# Patient Record
Sex: MALE | Race: OTHER | NOT HISPANIC OR LATINO | URBAN - METROPOLITAN AREA
[De-identification: names, ages, dates, MRNs, and addresses within clinical notes are randomized per-mention and may not be internally consistent; named-entity substitution may affect disease eponyms.]

---

## 2017-01-03 VITALS
WEIGHT: 192.9 LBS | RESPIRATION RATE: 18 BRPM | HEIGHT: 66 IN | OXYGEN SATURATION: 98 % | TEMPERATURE: 99 F | HEART RATE: 65 BPM | DIASTOLIC BLOOD PRESSURE: 62 MMHG | SYSTOLIC BLOOD PRESSURE: 125 MMHG

## 2017-01-03 NOTE — H&P ADULT. - HISTORY OF PRESENT ILLNESS
70yo male with PMHx of HTN, Hyperlipidemia, hypothyroid, CKD stage III (unknown baseline), CHF (unknown EF), MR, Afib (on pradaxa, last dose 1/2/16 - confirm) who presented to the cardiologist with c/o... (CCS III anginal symptoms as per MD note)    Echo: revealed apical hypokinesis, EF 40% (as per MD note).  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 Srivastava.    In light of patients CCS III anginal symptoms, and abnormal Echo and Holter monitor patient is referred for cardiac catheterization w/ intervention if indicated. 70yo male, FamHx of Heart disease, with PMHx of HTN, Hyperlipidemia, hypothyroid, CKD stage III (unknown baseline), systolic CHF (EF 40% by Echo), MR, Maurice (on pradaxa, last dose 1/2/16) who presented to the cardiologist with c/o several months of SOB upon walking several blocks (CCS III anginal equivalent symptoms).  Patient states since he was prescribed Lasix he no longer feels SOB.  He denies any CP, palpitations, LE swelling, orthopnea, PND, dizziness, syncope, fevers, chills.    Patient underwent an Echo: revealed apical hypokinesis, EF 40% (as per MD note).  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 Srivastava (patient states he has not seen Dr. Srivastava yet).    In light of patients CCS III anginal equivalent symptoms, abnormal Echo and Holter monitor patient is referred for cardiac catheterization w/ intervention if indicated. ** confirm meds on arrival  ** attempting to obtain Imaging studies and recent lab work from office    72yo male, FamHx of Heart disease, with PMHx of HTN, Hyperlipidemia, hypothyroid, CKD stage III (unknown baseline), systolic CHF (EF 40% by Echo), MR, Afib (on pradaxa, last dose 1/2/16) who presented to the cardiologist with c/o several months of SOB upon walking several blocks (CCS III anginal equivalent symptoms).  Patient states since he was prescribed Lasix he no longer feels SOB.  He denies any CP, palpitations, LE swelling, orthopnea, PND, dizziness, syncope, fevers, chills.  Patient underwent an Echo: revealed apical hypokinesis, EF 40% (as per MD note).  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 Srivastava (patient states he has not seen Dr. Srivastava yet).    In light of patients CCS III anginal equivalent symptoms, abnormal Echo and Holter monitor patient is referred for cardiac catheterization w/ intervention if indicated. ** confirm meds on arrival  ** Early for hydration.  ** awaiting Fax of recent Imaging studies and recent lab work from office    70yo male, FamHx of Heart disease, with PMHx of HTN, Hyperlipidemia, hypothyroid, CKD stage III (unknown baseline), systolic CHF (EF 40% by Echo), MR, Afib (on pradaxa, last dose 1/2/16) who presented to the cardiologist with c/o several months of SOB upon walking several blocks (CCS III anginal equivalent symptoms).  Patient states since he was prescribed Lasix he no longer feels SOB.  He denies any CP, palpitations, LE swelling, orthopnea, PND, dizziness, syncope, fevers, chills.  Patient underwent an Echo: revealed apical hypokinesis, EF 40% (as per MD note).  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 Srivastava (patient states he has not seen Dr. Srivastava yet).    In light of patients CCS III anginal equivalent symptoms, abnormal Echo and Holter monitor patient is referred for cardiac catheterization w/ intervention if indicated. ** confirm meds on arrival  ** Early for hydration.     70yo male, FamHx of Heart disease, with PMHx of HTN, Hyperlipidemia, hypothyroid, CKD stage III (Crt 1.65 on 12/2/16), systolic CHF (EF 40% by Echo 3/11/16), Afib (on pradaxa, last dose 1/2/16) who presented to the cardiologist with c/o several months of SOB upon walking several blocks (CCS III anginal equivalent symptoms).  Patient states since he was prescribed Lasix he no longer feels SOB.  He denies any CP, palpitations, LE swelling, orthopnea, PND, dizziness, syncope, fevers, chills.  Patient underwent an Echo 3/11/16: revealed mid and apical anterior wall hypokinesis, basal and mid inferior wall hypokinesis, and apical akinesis, EF 40%, mod to severe dilation of LV, dilated left atrium, diastolic dysfunction, dilatation of the aortic root at the level of the sinusus of valsava measuring 4.1cm, moderate AI, moderate MR.  Patient also underwent a Holter monitor 12/8/16: revealed predominant Afib, puases were noted (longest 2.628sec), Ventricular ectopics were noted.  Patient referred to Dr. Srivastava (patient states he has not seen Dr. Srivastava yet).    In light of patients CCS III anginal equivalent symptoms, abnormal Echo and Holter monitor patient is referred for cardiac catheterization w/ intervention if indicated. 70yo male, FamHx of Heart disease, with PMHx of HTN, Hyperlipidemia, hypothyroid, CKD stage III (Crt 1.65 on 12/2/16), systolic CHF (EF 40% by Echo 3/11/16), Afib (on pradaxa, last dose 1/2/16) who presented to the cardiologist with c/o several months of SOB upon walking several blocks (CCS III anginal equivalent symptoms).  Patient states since he was prescribed Lasix he no longer feels SOB.  He denies any CP, palpitations, LE swelling, orthopnea, PND, dizziness, syncope, fevers, chills.  Patient underwent an Echo 3/11/16: revealed mid and apical anterior wall hypokinesis, basal and mid inferior wall hypokinesis, and apical akinesis, EF 40%, mod to severe dilation of LV, dilated left atrium, diastolic dysfunction, dilatation of the aortic root at the level of the sinusus of valsava measuring 4.1cm, moderate AI, moderate MR.  Patient also underwent a Holter monitor 12/8/16: revealed predominant Afib, puases were noted (longest 2.628sec), Ventricular ectopics were noted.  Patient referred to Dr. Srivastava (patient states he has not seen Dr. Srivastava yet).    In light of patients CCS III anginal equivalent symptoms, abnormal Echo and Holter monitor patient is referred for cardiac catheterization w/ intervention if indicated.

## 2017-01-03 NOTE — H&P ADULT. - ASSESSMENT
70yo Male FmHx of Heart disease, with PMHx of HTN, Hyperlipidemia, hypothyroid, CKD stage III (Crt 1.65 on 12/2/16), systolic CHF (EF 40% by Echo 3/11/16), Afib (on pradaxa, last dose 1/2/16) who presented to the cardiologist with c/o several months of SOB upon walking several blocks, Echo 3/11/16: revealed mid and apical anterior wall hypokinesis, basal and mid inferior wall hypokinesis, and apical akinesis, EF 40%, mod to severe dilation of LV, dilated left atrium, diastolic dysfunction, dilatation of the aortic root at the level of the sinusus of valsava measuring 4.1cm, moderate AI, moderate MR now presents for cardiac catheterization with possible intervention.   Cr 1.6 in 12/2-16, Cr today  Patient to continue IVF hydration 72yo Male FmHx of Heart disease, with PMHx of HTN, Hyperlipidemia, hypothyroid, CKD stage III (Crt 1.65 on 12/2/16), systolic CHF (EF 40% by Echo 3/11/16), Afib (on pradaxa, last dose 1/2/16) who presented to the cardiologist with c/o several months of SOB upon walking several blocks, Echo 3/11/16: revealed mid and apical anterior wall hypokinesis, basal and mid inferior wall hypokinesis, and apical akinesis, EF 40%, mod to severe dilation of LV, dilated left atrium, diastolic dysfunction, dilatation of the aortic root at the level of the sinusus of valsava measuring 4.1cm, moderate AI, moderate MR now presents for cardiac catheterization with possible intervention.   Cr 1.6 in 12/2-16, Cr today 1.4  Patient to continue IVF hydration

## 2017-01-03 NOTE — H&P ADULT. - PMH
Atrial fibrillation    CHF (congestive heart failure)    CKD (chronic kidney disease) stage 3, GFR 30-59 ml/min    Hyperlipidemia    Hypertension    Hypothyroid

## 2017-01-03 NOTE — H&P ADULT. - FAMILY HISTORY
Mother  Still living? Unknown  Family history of chronic ischemic heart disease, Age at diagnosis: Age Unknown

## 2017-01-03 NOTE — H&P ADULT. - NEGATIVE CARDIOVASCULAR SYMPTOMS
no peripheral edema/no orthopnea/no claudication/no chest pain/no paroxysmal nocturnal dyspnea/no palpitations

## 2017-01-05 ENCOUNTER — INPATIENT (INPATIENT)
Facility: HOSPITAL | Age: 72
LOS: 1 days | Discharge: HOME | DRG: 287 | End: 2017-01-07
Attending: INTERNAL MEDICINE | Admitting: INTERNAL MEDICINE
Payer: MEDICARE

## 2017-01-05 LAB
ALBUMIN SERPL ELPH-MCNC: 4 G/DL — SIGNIFICANT CHANGE UP (ref 3.4–5)
ALP SERPL-CCNC: 72 U/L — SIGNIFICANT CHANGE UP (ref 40–120)
ALT FLD-CCNC: 19 U/L — SIGNIFICANT CHANGE UP (ref 12–42)
ANION GAP SERPL CALC-SCNC: 8 MMOL/L — LOW (ref 9–16)
APTT BLD: 29.7 SEC — SIGNIFICANT CHANGE UP (ref 27.5–37.4)
AST SERPL-CCNC: 28 U/L — SIGNIFICANT CHANGE UP (ref 15–37)
BASOPHILS NFR BLD AUTO: 0.5 % — SIGNIFICANT CHANGE UP (ref 0–2)
BILIRUB SERPL-MCNC: 1.1 MG/DL — SIGNIFICANT CHANGE UP (ref 0.2–1.2)
BUN SERPL-MCNC: 29 MG/DL — HIGH (ref 7–23)
CALCIUM SERPL-MCNC: 8.9 MG/DL — SIGNIFICANT CHANGE UP (ref 8.5–10.5)
CHLORIDE SERPL-SCNC: 103 MMOL/L — SIGNIFICANT CHANGE UP (ref 96–108)
CHOLEST SERPL-MCNC: 121 MG/DL — SIGNIFICANT CHANGE UP
CK MB CFR SERPL CALC: 2.7 NG/ML — SIGNIFICANT CHANGE UP (ref 0.5–3.6)
CK SERPL-CCNC: 277 U/L — SIGNIFICANT CHANGE UP (ref 39–308)
CO2 SERPL-SCNC: 27 MMOL/L — SIGNIFICANT CHANGE UP (ref 22–31)
CREAT SERPL-MCNC: 1.4 MG/DL — HIGH (ref 0.5–1.3)
CRP SERPL-MCNC: 0.78 MG/DL — HIGH
EOSINOPHIL NFR BLD AUTO: 1.2 % — SIGNIFICANT CHANGE UP (ref 0–6)
GLUCOSE SERPL-MCNC: 112 MG/DL — HIGH (ref 70–99)
HBA1C BLD-MCNC: 6.4 % — HIGH (ref 4.8–5.6)
HCT VFR BLD CALC: 36 % — LOW (ref 39–50)
HDLC SERPL-MCNC: 44 MG/DL — SIGNIFICANT CHANGE UP
HGB BLD-MCNC: 12.2 G/DL — LOW (ref 13–17)
INR BLD: 1.23 — HIGH (ref 0.88–1.16)
LIPID PNL WITH DIRECT LDL SERPL: 66 MG/DL — SIGNIFICANT CHANGE UP
LYMPHOCYTES # BLD AUTO: 18.6 % — SIGNIFICANT CHANGE UP (ref 13–44)
MCHC RBC-ENTMCNC: 31.4 PG — SIGNIFICANT CHANGE UP (ref 27–34)
MCHC RBC-ENTMCNC: 33.9 G/DL — SIGNIFICANT CHANGE UP (ref 32–36)
MCV RBC AUTO: 92.5 FL — SIGNIFICANT CHANGE UP (ref 80–100)
MONOCYTES NFR BLD AUTO: 9.2 % — SIGNIFICANT CHANGE UP (ref 2–14)
NEUTROPHILS NFR BLD AUTO: 70.5 % — SIGNIFICANT CHANGE UP (ref 43–77)
PLATELET # BLD AUTO: 186 K/UL — SIGNIFICANT CHANGE UP (ref 150–400)
POTASSIUM SERPL-MCNC: 4.1 MMOL/L — SIGNIFICANT CHANGE UP (ref 3.5–5.3)
POTASSIUM SERPL-SCNC: 4.1 MMOL/L — SIGNIFICANT CHANGE UP (ref 3.5–5.3)
PROT SERPL-MCNC: 7.9 G/DL — SIGNIFICANT CHANGE UP (ref 6.4–8.2)
PROTHROM AB SERPL-ACNC: 13.7 SEC — HIGH (ref 10–13.1)
RBC # BLD: 3.89 M/UL — LOW (ref 4.2–5.8)
RBC # FLD: 13.2 % — SIGNIFICANT CHANGE UP (ref 10.3–16.9)
SODIUM SERPL-SCNC: 138 MMOL/L — SIGNIFICANT CHANGE UP (ref 135–145)
TOTAL CHOLESTEROL/HDL RATIO MEASUREMENT: 2.8 RATIO — SIGNIFICANT CHANGE UP
TRIGL SERPL-MCNC: 53 MG/DL — SIGNIFICANT CHANGE UP
WBC # BLD: 8.6 K/UL — SIGNIFICANT CHANGE UP (ref 3.8–10.5)
WBC # FLD AUTO: 8.6 K/UL — SIGNIFICANT CHANGE UP (ref 3.8–10.5)

## 2017-01-05 PROCEDURE — 99223 1ST HOSP IP/OBS HIGH 75: CPT

## 2017-01-05 PROCEDURE — 93458 L HRT ARTERY/VENTRICLE ANGIO: CPT | Mod: 26

## 2017-01-05 PROCEDURE — 93010 ELECTROCARDIOGRAM REPORT: CPT

## 2017-01-05 PROCEDURE — 93306 TTE W/DOPPLER COMPLETE: CPT | Mod: 26

## 2017-01-05 RX ORDER — DABIGATRAN ETEXILATE MESYLATE 150 MG/1
1 CAPSULE ORAL
Qty: 0 | Refills: 0 | COMMUNITY

## 2017-01-05 RX ORDER — LISINOPRIL 2.5 MG/1
1 TABLET ORAL
Qty: 0 | Refills: 0 | COMMUNITY

## 2017-01-05 RX ORDER — SPIRONOLACTONE 25 MG/1
1 TABLET, FILM COATED ORAL
Qty: 0 | Refills: 0 | COMMUNITY

## 2017-01-05 RX ORDER — DICLOFENAC SODIUM 30 MG/G
1 GEL TOPICAL
Qty: 0 | Refills: 0 | COMMUNITY

## 2017-01-05 RX ORDER — CLOPIDOGREL BISULFATE 75 MG/1
600 TABLET, FILM COATED ORAL ONCE
Qty: 0 | Refills: 0 | Status: COMPLETED | OUTPATIENT
Start: 2017-01-05 | End: 2017-01-05

## 2017-01-05 RX ORDER — LEVOTHYROXINE SODIUM 125 MCG
1 TABLET ORAL
Qty: 0 | Refills: 0 | COMMUNITY

## 2017-01-05 RX ORDER — LEVOTHYROXINE SODIUM 125 MCG
2 TABLET ORAL
Qty: 0 | Refills: 0 | COMMUNITY

## 2017-01-05 RX ORDER — ASPIRIN/CALCIUM CARB/MAGNESIUM 324 MG
325 TABLET ORAL ONCE
Qty: 0 | Refills: 0 | Status: COMPLETED | OUTPATIENT
Start: 2017-01-05 | End: 2017-01-05

## 2017-01-05 RX ORDER — CARVEDILOL PHOSPHATE 80 MG/1
1 CAPSULE, EXTENDED RELEASE ORAL
Qty: 0 | Refills: 0 | COMMUNITY

## 2017-01-05 RX ORDER — ERGOCALCIFEROL 1.25 MG/1
1 CAPSULE ORAL
Qty: 0 | Refills: 0 | COMMUNITY

## 2017-01-05 RX ORDER — ATORVASTATIN CALCIUM 80 MG/1
1 TABLET, FILM COATED ORAL
Qty: 0 | Refills: 0 | COMMUNITY

## 2017-01-05 RX ORDER — PREGABALIN 225 MG/1
1 CAPSULE ORAL
Qty: 0 | Refills: 0 | COMMUNITY

## 2017-01-05 RX ORDER — DIGOXIN 250 MCG
1 TABLET ORAL
Qty: 0 | Refills: 0 | COMMUNITY

## 2017-01-05 RX ORDER — SODIUM CHLORIDE 9 MG/ML
500 INJECTION INTRAMUSCULAR; INTRAVENOUS; SUBCUTANEOUS
Qty: 0 | Refills: 0 | Status: DISCONTINUED | OUTPATIENT
Start: 2017-01-05 | End: 2017-01-05

## 2017-01-05 RX ORDER — FUROSEMIDE 40 MG
1 TABLET ORAL
Qty: 0 | Refills: 0 | COMMUNITY

## 2017-01-05 RX ADMIN — Medication 325 MILLIGRAM(S): at 12:16

## 2017-01-05 RX ADMIN — SODIUM CHLORIDE 75 MILLILITER(S): 9 INJECTION INTRAMUSCULAR; INTRAVENOUS; SUBCUTANEOUS at 12:14

## 2017-01-05 RX ADMIN — CLOPIDOGREL BISULFATE 600 MILLIGRAM(S): 75 TABLET, FILM COATED ORAL at 12:15

## 2017-01-05 NOTE — PROGRESS NOTE ADULT - SUBJECTIVE AND OBJECTIVE BOX
Interventional Cardiology PA SDA Discharge Note    Patient without complaints.     Afebrile, VSS    Ext:    		  		Right Radial :   no hematoma, no    bleeding, dressing; C/D/I      Pulses:    intact RAD to baseline good waveform on pulse ox    A/P:  70yo male, FamHx of Heart disease, with PMHx of HTN, Hyperlipidemia, hypothyroid, CKD stage III (Crt 1.65 on 12/2/16), systolic CHF (EF 40% by Echo 3/11/16), Afib (on pradaxa, last dose 1/2/16) who presented to the cardiologist with c/o several months of SOB upon walking several blocks (CCS III anginal equivalent symptoms).  Pt underwent diagnostic cardiac catheterization with Dr Moreno which revealed:       mild luminal irregularities, Lcx normal, RCA mild, LHC 30-35%, LVEDP 19mg, NO AS and moderate MR. As per Dr Parmar, pt was sent for urgent 2d Echo after cath          1.	Stable for discharge as per attending Dr. Parmar after bed rest, pt voids, groin/wrist stable and 30 minutes of ambulation.  2.	Follow-up with PMD/Cardiologist Jessenia in 1-2 weeks  3.	Discharged forms signed and copies in chart Interventional Cardiology DHARMESH OSBORN Note    Patient without complaints.     Afebrile, VSS    Ext:    		  		Right Radial :   no hematoma, no    bleeding, dressing; C/D/I      Pulses:    intact RAD to baseline good waveform on pulse ox    A/P:  70yo male, FamHx of Heart disease, with PMHx of HTN, Hyperlipidemia, hypothyroid, CKD stage III (Crt 1.65 on 12/2/16), systolic CHF (EF 40% by Echo 3/11/16), Afib (on pradaxa, last dose 1/2/16) who presented to the cardiologist with c/o several months of SOB upon walking several blocks (CCS III anginal equivalent symptoms).  Pt underwent diagnostic cardiac catheterization with Dr Moreno which revealed:       mild luminal irregularities, Lcx normal, RCA mild, LHC 30-35%, LVEDP 19mg, NO AS and moderate MR. As per Dr Parmar, pt was sent for urgent 2d Echo after cath which revealed severe MR, moderate AS and moderate (4.3 cm) aortic root dilatation.      As per Dr Ruelas, pt will be admitted to [facility] for Dr Moctezuma to evaluate for mitral clip in AM.

## 2017-01-06 DIAGNOSIS — I50.22 CHRONIC SYSTOLIC (CONGESTIVE) HEART FAILURE: ICD-10-CM

## 2017-01-06 DIAGNOSIS — I34.0 NONRHEUMATIC MITRAL (VALVE) INSUFFICIENCY: ICD-10-CM

## 2017-01-06 LAB
ANION GAP SERPL CALC-SCNC: 10 MMOL/L — SIGNIFICANT CHANGE UP (ref 9–16)
BUN SERPL-MCNC: 20 MG/DL — SIGNIFICANT CHANGE UP (ref 7–23)
CALCIUM SERPL-MCNC: 8.8 MG/DL — SIGNIFICANT CHANGE UP (ref 8.5–10.5)
CHLORIDE SERPL-SCNC: 109 MMOL/L — HIGH (ref 96–108)
CO2 SERPL-SCNC: 25 MMOL/L — SIGNIFICANT CHANGE UP (ref 22–31)
CREAT SERPL-MCNC: 1.1 MG/DL — SIGNIFICANT CHANGE UP (ref 0.5–1.3)
GLUCOSE SERPL-MCNC: 91 MG/DL — SIGNIFICANT CHANGE UP (ref 70–99)
HCT VFR BLD CALC: 34.2 % — LOW (ref 39–50)
HGB BLD-MCNC: 11.5 G/DL — LOW (ref 13–17)
MAGNESIUM SERPL-MCNC: 2.2 MG/DL — SIGNIFICANT CHANGE UP (ref 1.6–2.4)
MCHC RBC-ENTMCNC: 30.9 PG — SIGNIFICANT CHANGE UP (ref 27–34)
MCHC RBC-ENTMCNC: 33.6 G/DL — SIGNIFICANT CHANGE UP (ref 32–36)
MCV RBC AUTO: 91.9 FL — SIGNIFICANT CHANGE UP (ref 80–100)
NT-PROBNP SERPL-SCNC: 2754 PG/ML — HIGH
PLATELET # BLD AUTO: 165 K/UL — SIGNIFICANT CHANGE UP (ref 150–400)
POTASSIUM SERPL-MCNC: 4.5 MMOL/L — SIGNIFICANT CHANGE UP (ref 3.5–5.3)
POTASSIUM SERPL-SCNC: 4.5 MMOL/L — SIGNIFICANT CHANGE UP (ref 3.5–5.3)
RBC # BLD: 3.72 M/UL — LOW (ref 4.2–5.8)
RBC # FLD: 13 % — SIGNIFICANT CHANGE UP (ref 10.3–16.9)
SODIUM SERPL-SCNC: 144 MMOL/L — SIGNIFICANT CHANGE UP (ref 135–145)
T4 AB SER-ACNC: 8.28 UG/DL — SIGNIFICANT CHANGE UP (ref 3.17–11.72)
TSH SERPL-MCNC: 0.4 UIU/ML — SIGNIFICANT CHANGE UP (ref 0.35–4.94)
WBC # BLD: 6.6 K/UL — SIGNIFICANT CHANGE UP (ref 3.8–10.5)
WBC # FLD AUTO: 6.6 K/UL — SIGNIFICANT CHANGE UP (ref 3.8–10.5)

## 2017-01-06 PROCEDURE — 99232 SBSQ HOSP IP/OBS MODERATE 35: CPT

## 2017-01-06 PROCEDURE — 93320 DOPPLER ECHO COMPLETE: CPT | Mod: 26

## 2017-01-06 PROCEDURE — 93312 ECHO TRANSESOPHAGEAL: CPT | Mod: 26

## 2017-01-06 PROCEDURE — 71010: CPT | Mod: 26

## 2017-01-06 PROCEDURE — 93325 DOPPLER ECHO COLOR FLOW MAPG: CPT | Mod: 26

## 2017-01-06 RX ORDER — DABIGATRAN ETEXILATE MESYLATE 150 MG/1
150 CAPSULE ORAL EVERY 12 HOURS
Qty: 0 | Refills: 0 | Status: DISCONTINUED | OUTPATIENT
Start: 2017-01-06 | End: 2017-01-07

## 2017-01-06 RX ORDER — SPIRONOLACTONE 25 MG/1
25 TABLET, FILM COATED ORAL DAILY
Qty: 0 | Refills: 0 | Status: DISCONTINUED | OUTPATIENT
Start: 2017-01-06 | End: 2017-01-07

## 2017-01-06 RX ORDER — ATORVASTATIN CALCIUM 80 MG/1
40 TABLET, FILM COATED ORAL AT BEDTIME
Qty: 0 | Refills: 0 | Status: DISCONTINUED | OUTPATIENT
Start: 2017-01-06 | End: 2017-01-07

## 2017-01-06 RX ORDER — CARVEDILOL PHOSPHATE 80 MG/1
25 CAPSULE, EXTENDED RELEASE ORAL EVERY 12 HOURS
Qty: 0 | Refills: 0 | Status: DISCONTINUED | OUTPATIENT
Start: 2017-01-06 | End: 2017-01-07

## 2017-01-06 RX ORDER — FUROSEMIDE 40 MG
40 TABLET ORAL
Qty: 0 | Refills: 0 | Status: DISCONTINUED | OUTPATIENT
Start: 2017-01-06 | End: 2017-01-07

## 2017-01-06 RX ORDER — DIGOXIN 250 MCG
0.12 TABLET ORAL DAILY
Qty: 0 | Refills: 0 | Status: DISCONTINUED | OUTPATIENT
Start: 2017-01-06 | End: 2017-01-07

## 2017-01-06 RX ORDER — LISINOPRIL 2.5 MG/1
40 TABLET ORAL DAILY
Qty: 0 | Refills: 0 | Status: DISCONTINUED | OUTPATIENT
Start: 2017-01-06 | End: 2017-01-07

## 2017-01-06 RX ORDER — LEVOTHYROXINE SODIUM 125 MCG
50 TABLET ORAL DAILY
Qty: 0 | Refills: 0 | Status: DISCONTINUED | OUTPATIENT
Start: 2017-01-06 | End: 2017-01-07

## 2017-01-06 RX ADMIN — CARVEDILOL PHOSPHATE 25 MILLIGRAM(S): 80 CAPSULE, EXTENDED RELEASE ORAL at 17:27

## 2017-01-06 RX ADMIN — SPIRONOLACTONE 25 MILLIGRAM(S): 25 TABLET, FILM COATED ORAL at 07:20

## 2017-01-06 RX ADMIN — ATORVASTATIN CALCIUM 40 MILLIGRAM(S): 80 TABLET, FILM COATED ORAL at 22:03

## 2017-01-06 RX ADMIN — Medication 0.12 MILLIGRAM(S): at 07:21

## 2017-01-06 RX ADMIN — Medication 40 MILLIGRAM(S): at 17:27

## 2017-01-06 RX ADMIN — DABIGATRAN ETEXILATE MESYLATE 150 MILLIGRAM(S): 150 CAPSULE ORAL at 17:27

## 2017-01-06 RX ADMIN — Medication 50 MICROGRAM(S): at 07:21

## 2017-01-06 RX ADMIN — LISINOPRIL 40 MILLIGRAM(S): 2.5 TABLET ORAL at 07:21

## 2017-01-06 NOTE — CONSULT NOTE ADULT - ASSESSMENT
72 y/o male w/ systolic CHF, afib, HTN, HLD, thyroid disease, ex-smoker with class III NHYA symptoms, here for further work-up.  Found to have severe functional MR.  No CAD on cardiac cath. 72 y/o male w/ systolic CHF, afib, HTN, HLD, thyroid disease, ex-smoker with class III NHYA symptoms, here for further work-up.  Found to have severe functional MR wither moderately reduced EF, moderate AI, nonobstructive CAD.

## 2017-01-06 NOTE — PROGRESS NOTE ADULT - SUBJECTIVE AND OBJECTIVE BOX
Interventional Cardiology PA Adult Progress Note    Subjective: No complaints.  	    MEDICATIONS:  spironolactone 25milliGRAM(s) Oral daily  lisinopril 40milliGRAM(s) Oral daily  digoxin     Tablet 0.125milliGRAM(s) Oral daily  carvedilol 25milliGRAM(s) Oral every 12 hours  furosemide    Tablet 40milliGRAM(s) Oral two times a day  atorvastatin 40milliGRAM(s) Oral at bedtime  levothyroxine 50MICROGram(s) Oral daily  dabigatran 150milliGRAM(s) Oral every 12 hours      PHYSICAL EXAM:  TELEMETRY:  T(C): 37.2, Max: 37.2 (01-06 @ 05:13)  HR: 85 (53 - 85)  BP: 125/68 (121/76 - 147/76)  RR: 16 (16 - 17)  SpO2: 98% (98% - 99%)  Wt(kg): --  I&O's Summary      [place]:                                       General: NAD	  HEENT:   Normal oral mucosa, PERRL, EOMI	  Neck: Supple, - JVD; Carotid Bruit   Cardiovascular: irregularly irregular, grade II murmur  Respiratory: Lungs clear to auscultation	  Gastrointestinal:  Soft, Non-tender, + BS	  Extremities: Normal range of motion, No clubbing, cyanosis or edema  Vascular: Peripheral pulses palpable 2+ bilaterally  Neurologic: A + O x3, no focal deficits.                            11.5   6.6   )-----------( 165      ( 06 Jan 2017 11:28 )             34.2     06 Jan 2017 11:21    144    |  109    |  20     ----------------------------<  91     4.5     |  25     |  1.10     Ca    8.8        06 Jan 2017 11:21  Mg     2.2       06 Jan 2017 11:21    TPro  7.9    /  Alb  4.0    /  TBili  1.1    /  DBili  x      /  AST  28     /  ALT  19     /  AlkPhos  72     05 Jan 2017 10:49    proBNP: Serum Pro-Brain Natriuretic Peptide: 2754 pg/mL (01-06 @ 11:21)    PT/INR - ( 05 Jan 2017 10:48 )   PT: 13.7 sec;   INR: 1.23          PTT - ( 05 Jan 2017 10:48 )  PTT:29.7 sec    ASSESSMENT/PLAN: 	71 male s/p diagnostic cath revealing non obs CAD, EF 30-35%, EDP 19, severe MR.  CHF - euvolemic. I/O, daily weight.  Severe MR - MARITA done, severe MR with tethering. pt has functional MR, not mitraclip candidate. Referred by Dr. Moctezuma to Dr. Braden. Pt to follow up as outpatient, CTS PA's aware to make appointment for patient. Pt for discharge home tomorrow to be given [facility] office number [phone].

## 2017-01-06 NOTE — CONSULT NOTE ADULT - PROBLEM SELECTOR RECOMMENDATION 9
-Plan for MARITA today, NPO since midnight.    -Dr. Jackson evaluated patient, will discuss case with Dr. Moctezuma.  -Plan to be determined based on MARITA findings, ?possible candidate for mitral E-clip. -Plan for MARITA today, NPO since midnight.    -Dr. Jackson evaluated patient, will discuss case with Dr. Moctezuma.  -Plan to be determined based on MARITA findings, ?possible candidate for mitral E-clip.  (addendum: MARITA showed severe functional MR wither moderately reduced EF, moderate AI. not candidate for Mitraclip given functional type MR. CV surgical evaluation.)

## 2017-01-06 NOTE — CONSULT NOTE ADULT - SUBJECTIVE AND OBJECTIVE BOX
Surgeon: Dr. Moctezuma    Requesting Physician: Dr. Parmar    HISTORY OF PRESENT ILLNESS:  71y Male with PMHx of HTN, HLD, chronic systolic congestive heart failure, chronic atrial fibrillation (on Pradaxa), hypothyroidism, CKD stage III (?new diagnosis), distant ex-smoker who was sent here by his out patient PCP for work-up for his known mitral regurgitation.  The patient states he has been experiencing JUARES for the past year on and off.  He states he gets SOB after walking 1-2 city blocks, no SOB with stairs or ADLs.  SOB is relieved with rest.  NYHA class III symptoms.  He also complains of orthopnea, and has recently gone from using 1 pillow to 2.  He tells me his SOB has gotten a little better recently due to his increased dose of lasix.  He denies any chest pain, dizziness, palpitations, LE edema, fatigue/weakness, syncope, N/V/D.      PAST MEDICAL & SURGICAL HISTORY:  CKD (chronic kidney disease) stage 3, GFR 30-59 ml/min  Atrial fibrillation  Hypothyroid  CHF (congestive heart failure)  Hyperlipidemia  Hypertension  No significant past surgical history      MEDICATIONS  (STANDING):  spironolactone 25milliGRAM(s) Oral daily  lisinopril 40milliGRAM(s) Oral daily  digoxin     Tablet 0.125milliGRAM(s) Oral daily  dabigatran 150milliGRAM(s) Oral every 12 hours  atorvastatin 40milliGRAM(s) Oral at bedtime  carvedilol 25milliGRAM(s) Oral every 12 hours  furosemide    Tablet 40milliGRAM(s) Oral two times a day  levothyroxine 50MICROGram(s) Oral daily    MEDICATIONS  (PRN):      Allergies    Allergy Status Unknown    Intolerances        SOCIAL HISTORY:  Smoker:  YES / NO        PACK YEARS:                         WHEN QUIT?  ETOH use:  YES / NO               FREQUENCY / QUANTITY:  Ilicit Drug use:  YES / NO  Occupation:  Assisted device use (Cane / Walker):  Live with:    FAMILY HISTORY:  Family history of chronic ischemic heart disease (Mother)      Review of Systems  CONSTITUTIONAL:  Fevers / chills, sweats, fatigue, weight loss, weight gain                                    NEGATIVE  NEURO:  parathesias, seizures, syncope, confusion                                                                               NEGATIVE  EYES:  Blurry vision, discharge, pain, loss of vision                                                                                  NEGATIVE  ENMT:  Difficulty hearing, vertigo, dysphagia, epistaxis, recent dental work                                     NEGATIVE  CV:  Chest pain, palpitations, JUARES, orthopnea                                                                                         NEGATIVE  RESPIRATORY:  Wheezing, SOB, cough / sputum, hemoptysis                                                              NEGATIVE  GI:  Nausea, vommiting, diarrhea, constipation, melena                                                                        NEGATIVE  : Hematuria, dysuria, urgency, incontinence                                                                                       NEGATIVE  MUSKULOSKELETAL:  arthritis, joint swelling, muscle weakness                                                           NEGATIVE  SKIN/BREAST:  rash, itching, pablo loss, masses                                                                                            NEGATIVE  PSYCH:  depresion, anxiety, suicidal ideation                                                                                             NEGATIVE  HEME/LYMPH:  bruises easily, enlarged lymph nodes, tender lymph nodes                                        NEGATIVE  ENDOCRINE:  cold intolerance, heat intolerance, polydipsia                                                                   NEGATIVE    PHYSICAL EXAM  Vital Signs Last 24 Hrs  T(C): 37.2, Max: 37.2 (01-06 @ 05:13)  T(F): 98.9, Max: 98.9 (01-06 @ 05:13)  HR: 59 (53 - 62)  BP: 147/76 (126/62 - 147/76)  BP(mean): --  RR: 16 (16 - 17)  SpO2: 98% (98% - 99%)    CONSTITUTIONAL:                                                                          WNL  NEURO:                                                                                             WNL                      EYES:                                                                                                  WNL  ENMT:                                                                                                WNL  CV:                                                                                                      WNL  RESPIRATORY:                                                                                  WNL  GI:                                                                                                       WNL  : SHEN + / -                                                                                 WNL  MUSKULOSKELETAL:                                                                       WNL  SKIN / BREAST:                                                                                 WNL                                                          LABS:                        12.2   8.6   )-----------( 186      ( 05 Jan 2017 10:48 )             36.0     05 Jan 2017 10:49    138    |  103    |  29     ----------------------------<  112    4.1     |  27     |  1.40     Ca    8.9        05 Jan 2017 10:49    TPro  7.9    /  Alb  4.0    /  TBili  1.1    /  DBili  x      /  AST  28     /  ALT  19     /  AlkPhos  72     05 Jan 2017 10:49    PT/INR - ( 05 Jan 2017 10:48 )   PT: 13.7 sec;   INR: 1.23          PTT - ( 05 Jan 2017 10:48 )  PTT:29.7 sec    CARDIAC MARKERS ( 05 Jan 2017 10:49 )  x     / x     / 277 U/L / x     / 2.7 ng/mL          RADIOLOGY & ADDITIONAL STUDIES:  CAROTID U/S:    CXR:    CT Scan:    EKG:    TTE / MARITA:    Cardiac Cath: Surgeon: Dr. Moctezuma    Requesting Physician: Dr. Parmar    HISTORY OF PRESENT ILLNESS:  71y Male with PMHx of HTN, HLD, chronic systolic congestive heart failure (EF 40%), chronic atrial fibrillation that was recently diagnosed in 2016 with Holter monitor (placed on Pradaxa), hypothyroidism, CKD stage III (?new diagnosis), distant ex-smoker who was sent here by his out patient PCP for work-up for his symptoms of SOB.  He was scheduled for an elective cardiac cath.  The patient states he has been experiencing JUARES for the past year on and off.  He states he gets SOB after walking 1-2 city blocks, no SOB with stairs or ADLs.  SOB is relieved with rest.  NYHA class III symptoms.  He also complains of orthopnea, and has recently gone from using 1 pillow to 2.  He tells me his SOB has gotten a little better recently due to his increased dose of lasix.  He denies any chest pain, dizziness, palpitations, LE edema, fatigue/weakness, syncope, N/V/D.  Denies any SOB currently while at rest lying in his bed.     Echo was done 3/2016 showing moderate MR. Cardiac cath 16 showed mild luminal irregularities, EF 30-35%, moderate MR.  Echo repeated here this admission showing severe MR, moderate AS and moderate aortic root dilitation (4.3cm).  Dr. Moctezuma is now consulted to further evaluate his MR.        PAST MEDICAL & SURGICAL HISTORY:  CKD (chronic kidney disease) stage 3, GFR 30-59 ml/min  Atrial fibrillation  Hypothyroid  CHF (congestive heart failure)  Hyperlipidemia  Hypertension  No significant past surgical history      MEDICATIONS  (STANDING):  spironolactone 25milliGRAM(s) Oral daily  lisinopril 40milliGRAM(s) Oral daily  digoxin     Tablet 0.125milliGRAM(s) Oral daily  dabigatran 150milliGRAM(s) Oral every 12 hours  atorvastatin 40milliGRAM(s) Oral at bedtime  carvedilol 25milliGRAM(s) Oral every 12 hours  furosemide    Tablet 40milliGRAM(s) Oral two times a day  levothyroxine 50MICROGram(s) Oral daily    MEDICATIONS  (PRN):      Allergies NKDA        SOCIAL HISTORY:  Smoker:  Ex-tobacco smoker       PACK YEARS: 40                       Quit 32 years ago  ETOH use:  Denies         Ilicit Drug use:  Denies  Occupation: Retired in  ()  Assisted device use (Cane / Walker): No  Live with: Has a wife and 2 kids over the age of 40, as well as other family (siblings) who are all supportive and live close by.      FAMILY HISTORY:  Family history of chronic ischemic heart disease (Mother)  Brothers x 2  of brain CA.        Review of Systems  CONSTITUTIONAL:  Fevers / chills, sweats, fatigue, weight loss, weight gain                                    NEGATIVE  NEURO:  parathesias, seizures, syncope, confusion                                                                               NEGATIVE  EYES:  Blurry vision, discharge, pain, loss of vision                                                                                  NEGATIVE  ENMT:  Difficulty hearing, vertigo, dysphagia, epistaxis, recent dental work                                     NEGATIVE  CV:  -Chest pain, -palpitations, +JUARES, +orthopnea                                                                                      POSITIVE    RESPIRATORY:  -Wheezing, +SOB, -cough / -sputum, -hemoptysis                                                             POSITIVE  GI:  Nausea, vommiting, diarrhea, constipation, melena                                                                        NEGATIVE  : Hematuria, dysuria, urgency, incontinence                                                                                       NEGATIVE  MUSKULOSKELETAL:  arthritis, joint swelling, muscle weakness                                                           NEGATIVE  SKIN/BREAST:  rash, itching, pablo loss, masses                                                                                            NEGATIVE  PSYCH:  depresion, anxiety, suicidal ideation                                                                                             NEGATIVE  HEME/LYMPH:  bruises easily, enlarged lymph nodes, tender lymph nodes                                        NEGATIVE  ENDOCRINE:  cold intolerance, heat intolerance, polydipsia                                                                   NEGATIVE    PHYSICAL EXAM  Vital Signs Last 24 Hrs  T(C): 37.2, Max: 37.2 (-06 @ 05:13)  T(F): 98.9, Max: 98.9 (-06 @ 05:13)  HR: 59 (53 - 62) Afib  BP: 147/76 (126/62 - 147/76)  BP(mean): --  RR: 16 (16 - 17)  SpO2: 98% (98% - 99%)    CONSTITUTIONAL:     Well developed male, lying comfortably in bed.  In NAD, no respiratory distress. Able to converse in full sentences.  Pleasant and cooperative.                                                                       NEURO:             No focal deficits on gross exam                                                                             EYES:                 No obvious abnormalities                                                                                 ENMT:                        WNL                                                                          CV:                        S1S1 irregularly irregular.  +holosystolic grade IV murmur heard at LSB, but heard best at apex.     +pulsatile JVD on right side                                                                         RESPIRATORY:           +expiratory wheeze heard at right upper lung, posteriorly.  Otherwise lungs clear.  No rales                                                                    GI:                                                                                                      Soft, non-tender, non-distended  : SHEN No                                                                               MUSKULOSKELETAL:         No gross abnormalities                                                          SKIN / BREAST:                     Age appropriate changes   EXT:                                    No peripheral edema, warm and well perfused.  2+ palpable pulses B/L (DP, PT, radial and femoral).  *Significant varicosities of upper and lower legs B/L.    *Right radial bandage on cath site, no visible hematoma.                                                                        LABS:                        12.2   8.6   )-----------( 186      ( 2017 10:48 )             36.0     2017 10:49    138    |  103    |  29     ----------------------------<  112    4.1     |  27     |  1.40     Ca    8.9        2017 10:49    TPro  7.9    /  Alb  4.0    /  TBili  1.1    /  DBili  x      /  AST  28     /  ALT  19     /  AlkPhos  72     2017 10:49    PT/INR - ( 2017 10:48 )   PT: 13.7 sec;   INR: 1.23          PTT - ( 2017 10:48 )  PTT:29.7 sec    CARDIAC MARKERS ( 2017 10:49 )  x     / x     / 277 U/L / x     / 2.7 ng/mL          RADIOLOGY & ADDITIONAL STUDIES:  CAROTID U/S:    CXR: Surgeon:    Requesting Physician:    HISTORY OF PRESENT ILLNESS:  71y Male    PAST MEDICAL & SURGICAL HISTORY:  CKD (chronic kidney disease) stage 3, GFR 30-59 ml/min  Atrial fibrillation  Hypothyroid  CHF (congestive heart failure)  Hyperlipidemia  Hypertension  No significant past surgical history      MEDICATIONS  (STANDING):  spironolactone 25milliGRAM(s) Oral daily  lisinopril 40milliGRAM(s) Oral daily  digoxin     Tablet 0.125milliGRAM(s) Oral daily  dabigatran 150milliGRAM(s) Oral every 12 hours  atorvastatin 40milliGRAM(s) Oral at bedtime  carvedilol 25milliGRAM(s) Oral every 12 hours  furosemide    Tablet 40milliGRAM(s) Oral two times a day  levothyroxine 50MICROGram(s) Oral daily    MEDICATIONS  (PRN):      Allergies    Allergy Status Unknown    Intolerances        SOCIAL HISTORY:  Smoker:  YES / NO        PACK YEARS:                         WHEN QUIT?  ETOH use:  YES / NO               FREQUENCY / QUANTITY:  Ilicit Drug use:  YES / NO  Occupation:  Assisted device use (Cane / Walker):  Live with:    FAMILY HISTORY:  Family history of chronic ischemic heart disease (Mother)      Review of Systems  CONSTITUTIONAL:  Fevers / chills, sweats, fatigue, weight loss, weight gain                                    NEGATIVE  NEURO:  parathesias, seizures, syncope, confusion                                                                               NEGATIVE  EYES:  Blurry vision, discharge, pain, loss of vision                                                                                  NEGATIVE  ENMT:  Difficulty hearing, vertigo, dysphagia, epistaxis, recent dental work                                     NEGATIVE  CV:  Chest pain, palpitations, JUARES, orthopnea                                                                                         NEGATIVE  RESPIRATORY:  Wheezing, SOB, cough / sputum, hemoptysis                                                              NEGATIVE  GI:  Nausea, vommiting, diarrhea, constipation, melena                                                                        NEGATIVE  : Hematuria, dysuria, urgency, incontinence                                                                                       NEGATIVE  MUSKULOSKELETAL:  arthritis, joint swelling, muscle weakness                                                           NEGATIVE  SKIN/BREAST:  rash, itching, pablo loss, masses                                                                                            NEGATIVE  PSYCH:  depresion, anxiety, suicidal ideation                                                                                             NEGATIVE  HEME/LYMPH:  bruises easily, enlarged lymph nodes, tender lymph nodes                                        NEGATIVE  ENDOCRINE:  cold intolerance, heat intolerance, polydipsia                                                                   NEGATIVE    PHYSICAL EXAM  Vital Signs Last 24 Hrs  T(C): 37.2, Max: 37.2 ( @ 05:13)  T(F): 98.9, Max: 98.9 ( @ 05:13)  HR: 59 (53 - 62)  BP: 147/76 (126/62 - 147/76)  BP(mean): --  RR: 16 (16 - 17)  SpO2: 98% (98% - 99%)    CONSTITUTIONAL:                                                                          WNL  NEURO:                                                                                             WNL                      EYES:                                                                                                  WNL  ENMT:                                                                                                WNL  CV:                                                                                                      WNL  RESPIRATORY:                                                                                  WNL  GI:                                                                                                       WNL  : SHEN + / -                                                                                 WNL  MUSKULOSKELETAL:                                                                       WNL  SKIN / BREAST:                                                                                 WNL                                                          LABS:                        12.2   8.6   )-----------( 186      ( 2017 10:48 )             36.0     2017 10:49    138    |  103    |  29     ----------------------------<  112    4.1     |  27     |  1.40     Ca    8.9        2017 10:49    TPro  7.9    /  Alb  4.0    /  TBili  1.1    /  DBili  x      /  AST  28     /  ALT  19     /  AlkPhos  72     2017 10:49    PT/INR - ( 2017 10:48 )   PT: 13.7 sec;   INR: 1.23          PTT - ( 2017 10:48 )  PTT:29.7 sec    CARDIAC MARKERS ( 2017 10:49 )  x     / x     / 277 U/L / x     / 2.7 ng/mL          RADIOLOGY & ADDITIONAL STUDIES:  CAROTID U/S:    CXR:Surgeon:    Requesting Physician:    HISTORY OF PRESENT ILLNESS:  71y Male    PAST MEDICAL & SURGICAL HISTORY:  CKD (chronic kidney disease) stage 3, GFR 30-59 ml/min  Atrial fibrillation  Hypothyroid  CHF (congestive heart failure)  Hyperlipidemia  Hypertension  No significant past surgical history      MEDICATIONS  (STANDING):  spironolactone 25milliGRAM(s) Oral daily  lisinopril 40milliGRAM(s) Oral daily  digoxin     Tablet 0.125milliGRAM(s) Oral daily  dabigatran 150milliGRAM(s) Oral every 12 hours  atorvastatin 40milliGRAM(s) Oral at bedtime  carvedilol 25milliGRAM(s) Oral every 12 hours  furosemide    Tablet 40milliGRAM(s) Oral two times a day  levothyroxine 50MICROGram(s) Oral daily    MEDICATIONS  (PRN):      Allergies    Allergy Status Unknown    Intolerances        SOCIAL HISTORY:  Smoker:  YES / NO        PACK YEARS:                         WHEN QUIT?  ETOH use:  YES / NO               FREQUENCY / QUANTITY:  Ilicit Drug use:  YES / NO  Occupation:  Assisted device use (Cane / Walker):  Live with:    FAMILY HISTORY:  Family history of chronic ischemic heart disease (Mother)      Review of Systems  CONSTITUTIONAL:  Fevers / chills, sweats, fatigue, weight loss, weight gain                                    NEGATIVE  NEURO:  parathesias, seizures, syncope, confusion                                                                               NEGATIVE  EYES:  Blurry vision, discharge, pain, loss of vision                                                                                  NEGATIVE  ENMT:  Difficulty hearing, vertigo, dysphagia, epistaxis, recent dental work                                     NEGATIVE  CV:  Chest pain, palpitations, JUARES, orthopnea                                                                                         NEGATIVE  RESPIRATORY:  Wheezing, SOB, cough / sputum, hemoptysis                                                              NEGATIVE  GI:  Nausea, vommiting, diarrhea, constipation, melena                                                                        NEGATIVE  : Hematuria, dysuria, urgency, incontinence                                                                                       NEGATIVE  MUSKULOSKELETAL:  arthritis, joint swelling, muscle weakness                                                           NEGATIVE  SKIN/BREAST:  rash, itching, pablo loss, masses                                                                                            NEGATIVE  PSYCH:  depresion, anxiety, suicidal ideation                                                                                             NEGATIVE  HEME/LYMPH:  bruises easily, enlarged lymph nodes, tender lymph nodes                                        NEGATIVE  ENDOCRINE:  cold intolerance, heat intolerance, polydipsia                                                                   NEGATIVE    PHYSICAL EXAM  Vital Signs Last 24 Hrs  T(C): 37.2, Max: 37.2 ( @ 05:13)  T(F): 98.9, Max: 98.9 ( @ 05:13)  HR: 59 (53 - 62)  BP: 147/76 (126/62 - 147/76)  BP(mean): --  RR: 16 (16 - 17)  SpO2: 98% (98% - 99%)    CONSTITUTIONAL:                                                                          WNL  NEURO:                                                                                             WNL                      EYES:                                                                                                  WNL  ENMT:                                                                                                WNL  CV:                                                                                                      WNL  RESPIRATORY:                                                                                  WNL  GI:                                                                                                       WNL  : SHEN + / -                                                                                 WNL  MUSKULOSKELETAL:                                                                       WNL  SKIN / BREAST:                                                                                 WNL                                                          LABS:                        12.2   8.6   )-----------( 186      ( 2017 10:48 )             36.0     2017 10:49    138    |  103    |  29     ----------------------------<  112    4.1     |  27     |  1.40     Ca    8.9        2017 10:49    TPro  7.9    /  Alb  4.0    /  TBili  1.1    /  DBili  x      /  AST  28     /  ALT  19     /  AlkPhos  72     2017 10:49    PT/INR - ( 2017 10:48 )   PT: 13.7 sec;   INR: 1.23          PTT - ( 2017 10:48 )  PTT:29.7 sec    CARDIAC MARKERS ( 2017 10:49 )  x     / x     / 277 U/L / x     / 2.7 ng/mL          RADIOLOGY & ADDITIONAL STUDIES:  CAROTID U/S:    CXR: Not done      EKG: Surgeon:    Requesting Physician:    HISTORY OF PRESENT ILLNESS:  71y Male    PAST MEDICAL & SURGICAL HISTORY:  CKD (chronic kidney disease) stage 3, GFR 30-59 ml/min  Atrial fibrillation  Hypothyroid  CHF (congestive heart failure)  Hyperlipidemia  Hypertension  No significant past surgical history      MEDICATIONS  (STANDING):  spironolactone 25milliGRAM(s) Oral daily  lisinopril 40milliGRAM(s) Oral daily  digoxin     Tablet 0.125milliGRAM(s) Oral daily  dabigatran 150milliGRAM(s) Oral every 12 hours  atorvastatin 40milliGRAM(s) Oral at bedtime  carvedilol 25milliGRAM(s) Oral every 12 hours  furosemide    Tablet 40milliGRAM(s) Oral two times a day  levothyroxine 50MICROGram(s) Oral daily    MEDICATIONS  (PRN):      Allergies    Allergy Status Unknown    Intolerances        SOCIAL HISTORY:  Smoker:  YES / NO        PACK YEARS:                         WHEN QUIT?  ETOH use:  YES / NO               FREQUENCY / QUANTITY:  Ilicit Drug use:  YES / NO  Occupation:  Assisted device use (Cane / Walker):  Live with:    FAMILY HISTORY:  Family history of chronic ischemic heart disease (Mother)      Review of Systems  CONSTITUTIONAL:  Fevers / chills, sweats, fatigue, weight loss, weight gain                                    NEGATIVE  NEURO:  parathesias, seizures, syncope, confusion                                                                               NEGATIVE  EYES:  Blurry vision, discharge, pain, loss of vision                                                                                  NEGATIVE  ENMT:  Difficulty hearing, vertigo, dysphagia, epistaxis, recent dental work                                     NEGATIVE  CV:  Chest pain, palpitations, JUARES, orthopnea                                                                                         NEGATIVE  RESPIRATORY:  Wheezing, SOB, cough / sputum, hemoptysis                                                              NEGATIVE  GI:  Nausea, vommiting, diarrhea, constipation, melena                                                                        NEGATIVE  : Hematuria, dysuria, urgency, incontinence                                                                                       NEGATIVE  MUSKULOSKELETAL:  arthritis, joint swelling, muscle weakness                                                           NEGATIVE  SKIN/BREAST:  rash, itching, pablo loss, masses                                                                                            NEGATIVE  PSYCH:  depresion, anxiety, suicidal ideation                                                                                             NEGATIVE  HEME/LYMPH:  bruises easily, enlarged lymph nodes, tender lymph nodes                                        NEGATIVE  ENDOCRINE:  cold intolerance, heat intolerance, polydipsia                                                                   NEGATIVE    PHYSICAL EXAM  Vital Signs Last 24 Hrs  T(C): 37.2, Max: 37.2 ( @ 05:13)  T(F): 98.9, Max: 98.9 ( @ 05:13)  HR: 59 (53 - 62)  BP: 147/76 (126/62 - 147/76)  BP(mean): --  RR: 16 (16 - 17)  SpO2: 98% (98% - 99%)    CONSTITUTIONAL:                                                                          WNL  NEURO:                                                                                             WNL                      EYES:                                                                                                  WNL  ENMT:                                                                                                WNL  CV:                                                                                                      WNL  RESPIRATORY:                                                                                  WNL  GI:                                                                                                       WNL  : SHEN + / -                                                                                 WNL  MUSKULOSKELETAL:                                                                       WNL  SKIN / BREAST:                                                                                 WNL                                                          LABS:                        12.2   8.6   )-----------( 186      ( 2017 10:48 )             36.0     2017 10:49    138    |  103    |  29     ----------------------------<  112    4.1     |  27     |  1.40     Ca    8.9        2017 10:49    TPro  7.9    /  Alb  4.0    /  TBili  1.1    /  DBili  x      /  AST  28     /  ALT  19     /  AlkPhos  72     2017 10:49    PT/INR - ( 2017 10:48 )   PT: 13.7 sec;   INR: 1.23          PTT - ( 2017 10:48 )  PTT:29.7 sec    CARDIAC MARKERS ( 2017 10:49 )  x     / x     / 277 U/L / x     / 2.7 ng/mL          RADIOLOGY & ADDITIONAL STUDIES:    CXR: Not done yet    EKG: atrial fibrillation 70's    TTE 16  There is moderate aortic   regurgitation, The leftventricular ejection fraction is 35%  Tethered mitral valve leaflets with normal opening.Severe,   eccentric, posteriorly directed mitral regurgitation. The effective   regurgitant orifice,  calculated by the PISA method, is 0.4 cm2  The   regurgitant volume, based on the PISA calculation, is 90 ml.Structurally   normal tricuspid valve. There is trace tricuspid regurgitation. There is no pericardial effusion.    Cardiac Cath: non-obstructive CAD, EF 30-35%, severe MR Surgeon: Dr. Moctezuma    Requesting Physician: Dr. Parmar    HISTORY OF PRESENT ILLNESS:  71y Male with PMHx of HTN, HLD, chronic systolic congestive heart failure (EF 40%), chronic atrial fibrillation that was recently diagnosed in 2016 with Holter monitor (placed on Pradaxa), hypothyroidism, CKD stage III (?new diagnosis), distant ex-smoker who was sent here by his out patient PCP for work-up for his symptoms of SOB.  He was scheduled for an elective cardiac cath.  The patient states he has been experiencing JUARES for the past year on and off.  He states he gets SOB after walking 1-2 city blocks, no SOB with stairs or ADLs.  SOB is relieved with rest.  NYHA class III symptoms.  He also complains of orthopnea, and has recently gone from using 1 pillow to 2.  He tells me his SOB has gotten a little better recently due to his increased dose of lasix.  He denies any chest pain, dizziness, palpitations, LE edema, fatigue/weakness, syncope, N/V/D.  Denies any SOB currently while at rest lying in his bed.     Echo was done 3/2016 showing moderate MR. Cardiac cath 16 showed mild luminal irregularities, EF 30-35%, moderate MR.  Echo repeated here this admission showing severe MR, moderate AS and moderate aortic root dilitation (4.3cm).  Dr. Moctezuma is now consulted to further evaluate his MR.        PAST MEDICAL & SURGICAL HISTORY:  CKD (chronic kidney disease) stage 3, GFR 30-59 ml/min  Atrial fibrillation  Hypothyroid  CHF (congestive heart failure)  Hyperlipidemia  Hypertension  No significant past surgical history      MEDICATIONS  (STANDING):  spironolactone 25milliGRAM(s) Oral daily  lisinopril 40milliGRAM(s) Oral daily  digoxin     Tablet 0.125milliGRAM(s) Oral daily  dabigatran 150milliGRAM(s) Oral every 12 hours  atorvastatin 40milliGRAM(s) Oral at bedtime  carvedilol 25milliGRAM(s) Oral every 12 hours  furosemide    Tablet 40milliGRAM(s) Oral two times a day  levothyroxine 50MICROGram(s) Oral daily    MEDICATIONS  (PRN):      Allergies NKDA        SOCIAL HISTORY:  Smoker:  Ex-tobacco smoker       PACK YEARS: 39                       Quit 32 years ago  ETOH use:  Denies         Ilicit Drug use:  Denies  Occupation: Retired in  ()  Assisted device use (Cane / Walker): No  Live with: Has a wife and 2 kids over the age of 40, as well as other family (siblings) who are all supportive and live close by.      FAMILY HISTORY:  Family history of chronic ischemic heart disease (Mother)  Brothers x 2  of brain CA.        Review of Systems  CONSTITUTIONAL:  Fevers / chills, sweats, fatigue, weight loss, weight gain                                    NEGATIVE  NEURO:  parathesias, seizures, syncope, confusion                                                                               NEGATIVE  EYES:  Blurry vision, discharge, pain, loss of vision                                                                                  NEGATIVE  ENMT:  Difficulty hearing, vertigo, dysphagia, epistaxis, recent dental work                                     NEGATIVE  CV:  -Chest pain, -palpitations, +JUARES, +orthopnea                                                                                      POSITIVE    RESPIRATORY:  -Wheezing, +SOB, -cough / -sputum, -hemoptysis                                                             POSITIVE  GI:  Nausea, vommiting, diarrhea, constipation, melena                                                                        NEGATIVE  : Hematuria, dysuria, urgency, incontinence                                                                                       NEGATIVE  MUSKULOSKELETAL:  arthritis, joint swelling, muscle weakness                                                           NEGATIVE  SKIN/BREAST:  rash, itching, pablo loss, masses                                                                                            NEGATIVE  PSYCH:  depresion, anxiety, suicidal ideation                                                                                             NEGATIVE  HEME/LYMPH:  bruises easily, enlarged lymph nodes, tender lymph nodes                                        NEGATIVE  ENDOCRINE:  cold intolerance, heat intolerance, polydipsia                                                                   NEGATIVE    PHYSICAL EXAM  Vital Signs Last 24 Hrs  T(C): 37.2, Max: 37.2 (-06 @ 05:13)  T(F): 98.9, Max: 98.9 (-06 @ 05:13)  HR: 59 (53 - 62) Afib  BP: 147/76 (126/62 - 147/76)  BP(mean): --  RR: 16 (16 - 17)  SpO2: 98% (98% - 99%)    CONSTITUTIONAL:     Well developed male, lying comfortably in bed.  In NAD, no respiratory distress. Able to converse in full sentences.  Pleasant and cooperative.                                                                       NEURO:             No focal deficits on gross exam                                                                             EYES:                 No obvious abnormalities                                                                                 ENMT:                        WNL                                                                          CV:                        S1S1 irregularly irregular.  +holosystolic grade IV murmur heard at LSB, but heard best at apex.     +pulsatile JVD on right side                                                                         RESPIRATORY:           +expiratory wheeze heard at right upper lung, posteriorly.  Otherwise lungs clear.  No rales                                                                    GI:                                                                                                      Soft, non-tender, non-distended  : SHEN No                                                                               MUSKULOSKELETAL:         No gross abnormalities                                                          SKIN / BREAST:                     Age appropriate changes   EXT:                                    No peripheral edema, warm and well perfused.  2+ palpable pulses B/L (DP, PT, radial and femoral).  *Significant varicosities of upper and lower legs B/L.    *Right radial bandage on cath site, no visible hematoma.                                                                        LABS:                        12.2   8.6   )-----------( 186      ( 2017 10:48 )             36.0     2017 10:49    138    |  103    |  29     ----------------------------<  112    4.1     |  27     |  1.40     Ca    8.9        2017 10:49    TPro  7.9    /  Alb  4.0    /  TBili  1.1    /  DBili  x      /  AST  28     /  ALT  19     /  AlkPhos  72     2017 10:49    PT/INR - ( 2017 10:48 )   PT: 13.7 sec;   INR: 1.23          PTT - ( 2017 10:48 )  PTT:29.7 sec    CARDIAC MARKERS ( 2017 10:49 )  x     / x     / 277 U/L / x     / 2.7 ng/mL      RADIOLOGY & ADDITIONAL STUDIES:    CXR: Not done yet    EKG: atrial fibrillation 70's    TTE 16  There is moderate aortic   regurgitation, The leftventricular ejection fraction is 35%  Tethered mitral valve leaflets with normal opening.Severe,   eccentric, posteriorly directed mitral regurgitation. The effective   regurgitant orifice,  calculated by the PISA method, is 0.4 cm2  The   regurgitant volume, based on the PISA calculation, is 90 ml.Structurally   normal tricuspid valve. There is trace tricuspid regurgitation. There is no pericardial effusion.    Cardiac Cath: non-obstructive CAD, EF 30-35%, severe MR

## 2017-01-07 VITALS
SYSTOLIC BLOOD PRESSURE: 112 MMHG | OXYGEN SATURATION: 97 % | HEART RATE: 58 BPM | DIASTOLIC BLOOD PRESSURE: 57 MMHG | RESPIRATION RATE: 15 BRPM

## 2017-01-07 LAB
ANION GAP SERPL CALC-SCNC: 8 MMOL/L — LOW (ref 9–16)
BUN SERPL-MCNC: 22 MG/DL — SIGNIFICANT CHANGE UP (ref 7–23)
CALCIUM SERPL-MCNC: 8.4 MG/DL — LOW (ref 8.5–10.5)
CHLORIDE SERPL-SCNC: 108 MMOL/L — SIGNIFICANT CHANGE UP (ref 96–108)
CO2 SERPL-SCNC: 26 MMOL/L — SIGNIFICANT CHANGE UP (ref 22–31)
CREAT SERPL-MCNC: 1.03 MG/DL — SIGNIFICANT CHANGE UP (ref 0.5–1.3)
GLUCOSE SERPL-MCNC: 162 MG/DL — HIGH (ref 70–99)
HCT VFR BLD CALC: 34.7 % — LOW (ref 39–50)
HGB BLD-MCNC: 11.5 G/DL — LOW (ref 13–17)
MAGNESIUM SERPL-MCNC: 2 MG/DL — SIGNIFICANT CHANGE UP (ref 1.6–2.4)
MCHC RBC-ENTMCNC: 30.7 PG — SIGNIFICANT CHANGE UP (ref 27–34)
MCHC RBC-ENTMCNC: 33.1 G/DL — SIGNIFICANT CHANGE UP (ref 32–36)
MCV RBC AUTO: 92.8 FL — SIGNIFICANT CHANGE UP (ref 80–100)
PLATELET # BLD AUTO: 159 K/UL — SIGNIFICANT CHANGE UP (ref 150–400)
POTASSIUM SERPL-MCNC: 4.1 MMOL/L — SIGNIFICANT CHANGE UP (ref 3.5–5.3)
POTASSIUM SERPL-SCNC: 4.1 MMOL/L — SIGNIFICANT CHANGE UP (ref 3.5–5.3)
RBC # BLD: 3.74 M/UL — LOW (ref 4.2–5.8)
RBC # FLD: 13.2 % — SIGNIFICANT CHANGE UP (ref 10.3–16.9)
SODIUM SERPL-SCNC: 142 MMOL/L — SIGNIFICANT CHANGE UP (ref 135–145)
T3 SERPL-MCNC: 81 NG/DL — SIGNIFICANT CHANGE UP (ref 80–200)
WBC # BLD: 7.2 K/UL — SIGNIFICANT CHANGE UP (ref 3.8–10.5)
WBC # FLD AUTO: 7.2 K/UL — SIGNIFICANT CHANGE UP (ref 3.8–10.5)

## 2017-01-07 PROCEDURE — 85730 THROMBOPLASTIN TIME PARTIAL: CPT

## 2017-01-07 PROCEDURE — 82553 CREATINE MB FRACTION: CPT

## 2017-01-07 PROCEDURE — 80048 BASIC METABOLIC PNL TOTAL CA: CPT

## 2017-01-07 PROCEDURE — 83735 ASSAY OF MAGNESIUM: CPT

## 2017-01-07 PROCEDURE — 93306 TTE W/DOPPLER COMPLETE: CPT

## 2017-01-07 PROCEDURE — 84436 ASSAY OF TOTAL THYROXINE: CPT

## 2017-01-07 PROCEDURE — 85610 PROTHROMBIN TIME: CPT

## 2017-01-07 PROCEDURE — 36415 COLL VENOUS BLD VENIPUNCTURE: CPT

## 2017-01-07 PROCEDURE — C1769: CPT

## 2017-01-07 PROCEDURE — 82550 ASSAY OF CK (CPK): CPT

## 2017-01-07 PROCEDURE — 80053 COMPREHEN METABOLIC PANEL: CPT

## 2017-01-07 PROCEDURE — 83880 ASSAY OF NATRIURETIC PEPTIDE: CPT

## 2017-01-07 PROCEDURE — 83036 HEMOGLOBIN GLYCOSYLATED A1C: CPT

## 2017-01-07 PROCEDURE — 71045 X-RAY EXAM CHEST 1 VIEW: CPT

## 2017-01-07 PROCEDURE — 93005 ELECTROCARDIOGRAM TRACING: CPT

## 2017-01-07 PROCEDURE — 80061 LIPID PANEL: CPT

## 2017-01-07 PROCEDURE — 85027 COMPLETE CBC AUTOMATED: CPT

## 2017-01-07 PROCEDURE — 84480 ASSAY TRIIODOTHYRONINE (T3): CPT

## 2017-01-07 PROCEDURE — C1887: CPT

## 2017-01-07 PROCEDURE — 93312 ECHO TRANSESOPHAGEAL: CPT

## 2017-01-07 PROCEDURE — 99233 SBSQ HOSP IP/OBS HIGH 50: CPT

## 2017-01-07 PROCEDURE — 86140 C-REACTIVE PROTEIN: CPT

## 2017-01-07 PROCEDURE — 85025 COMPLETE CBC W/AUTO DIFF WBC: CPT

## 2017-01-07 PROCEDURE — 84443 ASSAY THYROID STIM HORMONE: CPT

## 2017-01-07 RX ADMIN — Medication 40 MILLIGRAM(S): at 05:46

## 2017-01-07 RX ADMIN — SPIRONOLACTONE 25 MILLIGRAM(S): 25 TABLET, FILM COATED ORAL at 05:46

## 2017-01-07 RX ADMIN — Medication 0.12 MILLIGRAM(S): at 05:46

## 2017-01-07 RX ADMIN — CARVEDILOL PHOSPHATE 25 MILLIGRAM(S): 80 CAPSULE, EXTENDED RELEASE ORAL at 05:46

## 2017-01-07 RX ADMIN — LISINOPRIL 40 MILLIGRAM(S): 2.5 TABLET ORAL at 05:46

## 2017-01-07 RX ADMIN — Medication 50 MICROGRAM(S): at 05:46

## 2017-01-07 RX ADMIN — DABIGATRAN ETEXILATE MESYLATE 150 MILLIGRAM(S): 150 CAPSULE ORAL at 05:46

## 2017-01-07 NOTE — DISCHARGE NOTE ADULT - CARE PLAN
Principal Discharge DX:	Non-rheumatic mitral regurgitation  Goal:	You have a valve of your heart which is not functioning correctly.  You do not have any significant blockages in the arteries of your heart but need to follow up with the Cardiac Surgery team (Dr. Braden) in 1 week.  Instructions for follow-up, activity and diet:	Follow up with Dr. Ruelas in 1-2 weeks and make an appointment with Dr. Braden in 1 week.  Secondary Diagnosis:	Atrial fibrillation  Goal:	You have an irregular heart rhythm which you need to continue taking your Pradaxa 150mg twice a day and Digoxin 0.125mg daily.  Instructions for follow-up, activity and diet:	Follow up with Dr. Srivastava in 1-2 weeks.  Secondary Diagnosis:	CHF (congestive heart failure)  Goal:	Follow up with Dr. Rios 1-2 weeks.  Instructions for follow-up, activity and diet:	You have a history of heart failure and should continue your daily lasix and spironolactone. You should weigh yourself daily and if you notice a weight gain of more than 2-3 pounds in 2 days, shortness of breath, or lower extremity swelling you should contact your doctor immediately. Please restrict your fluid intake to 1-2L daily.  Secondary Diagnosis:	Hypertension  Goal:	Continue to monitor your blood pressure and taking Carvedilol 25mg twice a day, Lisinopril 40mg daily.  Instructions for follow-up, activity and diet:	Follow up with Dr. Ruelas in 1-2 weeks.  Secondary Diagnosis:	Hyperlipidemia  Goal:	Continue to monitor your cholesterol and taking your Atorvastatin 40mg daily at bedtime.  Instructions for follow-up, activity and diet:	Follow up with Dr. Ruelas in 1-2 weeks.  Secondary Diagnosis:	Hypothyroid

## 2017-01-07 NOTE — DISCHARGE NOTE ADULT - HOSPITAL COURSE
70yo male, FamHx of Heart disease, with PMHx of HTN, Hyperlipidemia, hypothyroid, CKD stage III (Crt 1.65 on 12/2/16), systolic CHF (EF 40% by Echo 3/11/16), Afib (on pradaxa, last dose 1/2/16) who presented to the cardiologist with c/o several months of SOB upon walking several blocks (CCS III anginal equivalent symptoms).  Patient states since he was prescribed Lasix he no longer feels SOB.  He denies any CP, palpitations, LE swelling, orthopnea, PND, dizziness, syncope, fevers, chills.  Patient underwent an Echo 3/11/16: revealed mid and apical anterior wall hypokinesis, basal and mid inferior wall hypokinesis, and apical akinesis, EF 40%, mod to severe dilation of LV, dilated left atrium, diastolic dysfunction, dilatation of the aortic root at the level of the sinusus of valsava measuring 4.1cm, moderate AI, moderate MR.  Patient also underwent a Holter monitor 12/8/16: revealed predominant Afib, puases were noted (longest 2.628sec), Ventricular ectopics were noted.  Patient referred to Dr. Srivastava (patient states he has not seen Dr. Srivastava yet).In light of patients CCS III anginal equivalent symptoms, abnormal Echo and Holter monitor patient is referred for cardiac catheterization w/ intervention if indicated. Pt underwent Cardiac Cath on 1/5/17 revealing mild luminal irregularities, LCx normal, RCA mild disease, EF 30-35%, LVEDP 19mmHg, No AS, moderate MR and underwent urgent 2D Echo revealing severe MR, moderate AS and moderate Aortic Root dilatation to 4.3cm. Dr. Moctezuma consulted for mitraclip candidacy and not a candidate as functional and referred to Dr. Braden.  Pt to f/u with Dr. Braden in 1 week and Dr. Ruelas in 1-2 weeks.  Pt also to f/u with Dr. Srivastava in 1-2 weeks for Afib.  Pt VSS, EKG and labs reviewed with Dr. Oh and pt stable for d/c.   Pt also has all medication which were reviewed in detail with patient.  D/C ppw signed and in chart.

## 2017-01-07 NOTE — DISCHARGE NOTE ADULT - MEDICATION SUMMARY - MEDICATIONS TO TAKE
I will START or STAY ON the medications listed below when I get home from the hospital:    spironolactone 25 mg oral tablet  -- 1 tab(s) by mouth once a day  -- Indication: For Chronic systolic congestive heart failure    lisinopril 40 mg oral tablet  -- 1 tab(s) by mouth once a day  -- Indication: For CHF (congestive heart failure)    digoxin 125 mcg (0.125 mg) oral tablet  -- 1 tab(s) by mouth once a day  -- Indication: For Atrial fibrillation    Pradaxa 150 mg oral capsule  -- 1 cap(s) by mouth 2 times a day  -- Indication: For Atrial fibrillation    atorvastatin 40 mg oral tablet  -- 1 tab(s) by mouth once a day  -- Indication: For Hyperlipidemia    Coreg 25 mg oral tablet  -- 1 tab(s) by mouth 2 times a day  -- Indication: For Hypertension    Voltaren 1% topical gel  -- Apply on skin to affected area 4 times a day, As Needed  -- Indication: For Home gel    Lasix 40 mg oral tablet  -- 1 tab(s) by mouth 2 times a day  -- Indication: For Chronic systolic congestive heart failure    Synthroid 25 mcg (0.025 mg) oral tablet  -- 2 tab(s) by mouth once a day  -- Indication: For Hypothyroid    cyanocobalamin 1000 mcg/mL injectable solution  -- 1 dose(s) injectable once a month  -- Indication: For Home supplement    ergocalciferol 50,000 intl units (1.25 mg) oral capsule  -- 1 cap(s) by mouth once a week  -- Indication: For Home supplement

## 2017-01-07 NOTE — DISCHARGE NOTE ADULT - CARE PROVIDER_API CALL
Crystal Srivastava), Cardiovascular Disease; Internal Medicine  [address] [place] [address]  Phone: [phone]  Fax: [phone]    Carla Ruelas), Internal Medicine  [address] [place] [address]  Phone: [phone]  Fax: [phone]    Jonh Braden), Cardiovascular Surgery  [address] [place] [address]  Phone: [phone]  Fax: [phone]

## 2017-01-07 NOTE — PROGRESS NOTE ADULT - SUBJECTIVE AND OBJECTIVE BOX
CT SURGERY NOTE:    As per Dr. Moctezuma pt is not a Mitral clip candidate and will need CT surgery follow-up as an outpatient for surgical management of MR. Pt to follow-up with Dr. Braden as an outpatient within one week of discharge.     Thank you for this consultation. Please feel free to reconsult if needed.

## 2017-01-07 NOTE — DISCHARGE NOTE ADULT - CARE PROVIDERS DIRECT ADDRESSES
[email or web address]

## 2017-01-07 NOTE — DISCHARGE NOTE ADULT - PLAN OF CARE
You have a valve of your heart which is not functioning correctly.  You do not have any significant blockages in the arteries of your heart but need to follow up with the Cardiac Surgery team (Dr. Braden) in 1 week. Follow up with Dr. Ruelas in 1-2 weeks and make an appointment with Dr. Braden in 1 week. You have an irregular heart rhythm which you need to continue taking your Pradaxa 150mg twice a day and Digoxin 0.125mg daily. Follow up with Dr. Srivastava in 1-2 weeks. You have a history of heart failure and should continue your daily lasix and spironolactone. You should weigh yourself daily and if you notice a weight gain of more than 2-3 pounds in 2 days, shortness of breath, or lower extremity swelling you should contact your doctor immediately. Please restrict your fluid intake to 1-2L daily. Follow up with Dr. Rios 1-2 weeks. Continue to monitor your blood pressure and taking Carvedilol 25mg twice a day, Lisinopril 40mg daily. Follow up with Dr. Ruelas in 1-2 weeks. Continue to monitor your cholesterol and taking your Atorvastatin 40mg daily at bedtime.

## 2017-01-10 DIAGNOSIS — I08.0 RHEUMATIC DISORDERS OF BOTH MITRAL AND AORTIC VALVES: ICD-10-CM

## 2017-01-10 DIAGNOSIS — I48.91 UNSPECIFIED ATRIAL FIBRILLATION: ICD-10-CM

## 2017-01-10 DIAGNOSIS — I25.119 ATHEROSCLEROTIC HEART DISEASE OF NATIVE CORONARY ARTERY WITH UNSPECIFIED ANGINA PECTORIS: ICD-10-CM

## 2017-01-10 DIAGNOSIS — R06.02 SHORTNESS OF BREATH: ICD-10-CM

## 2017-01-10 DIAGNOSIS — Z87.891 PERSONAL HISTORY OF NICOTINE DEPENDENCE: ICD-10-CM

## 2017-01-10 DIAGNOSIS — E78.5 HYPERLIPIDEMIA, UNSPECIFIED: ICD-10-CM

## 2017-01-10 DIAGNOSIS — I50.22 CHRONIC SYSTOLIC (CONGESTIVE) HEART FAILURE: ICD-10-CM

## 2017-01-10 DIAGNOSIS — Z82.49 FAMILY HISTORY OF ISCHEMIC HEART DISEASE AND OTHER DISEASES OF THE CIRCULATORY SYSTEM: ICD-10-CM

## 2017-01-10 DIAGNOSIS — Z79.02 LONG TERM (CURRENT) USE OF ANTITHROMBOTICS/ANTIPLATELETS: ICD-10-CM

## 2017-01-10 DIAGNOSIS — N18.3 CHRONIC KIDNEY DISEASE, STAGE 3 (MODERATE): ICD-10-CM

## 2017-01-10 DIAGNOSIS — E03.9 HYPOTHYROIDISM, UNSPECIFIED: ICD-10-CM

## 2017-01-10 DIAGNOSIS — I13.0 HYPERTENSIVE HEART AND CHRONIC KIDNEY DISEASE WITH HEART FAILURE AND STAGE 1 THROUGH STAGE 4 CHRONIC KIDNEY DISEASE, OR UNSPECIFIED CHRONIC KIDNEY DISEASE: ICD-10-CM

## 2022-08-03 NOTE — DISCHARGE NOTE ADULT - LOW SALT DIET. ACTIVITY AS TOLERATED. CALL YOUR DOCTOR FOR FOLLOW UP APPOINTMENT
Preventive Health Recommendations  Male Ages 21 - 25     Yearly exam:             See your health care provider every year in order to  o   Review health changes.   o   Discuss preventive care.    o   Review your medicines if your doctor has prescribed any.    You should be tested each year for STDs (sexually transmitted diseases).     Talk to your provider about cholesterol testing.      If you are at risk for diabetes, you should have a diabetes test (fasting glucose).    Shots: Get a flu shot each year. Get a tetanus shot every 10 years.     Nutrition:    Eat at least 5 servings of fruits and vegetables daily.     Eat whole-grain bread, whole-wheat pasta and brown rice instead of white grains and rice.     Get adequate calcium and Vitamin D.     Lifestyle    Exercise for at least 150 minutes a week (30 minutes a day, 5 days a week). This will help you control your weight and prevent disease.     Limit alcohol to one drink per day.     No smoking.     Wear sunscreen to prevent skin cancer.     See your dentist every six months for an exam and cleaning.     
Statement Selected